# Patient Record
Sex: FEMALE | Race: WHITE | NOT HISPANIC OR LATINO | Employment: OTHER | URBAN - METROPOLITAN AREA
[De-identification: names, ages, dates, MRNs, and addresses within clinical notes are randomized per-mention and may not be internally consistent; named-entity substitution may affect disease eponyms.]

---

## 2018-11-22 ENCOUNTER — APPOINTMENT (EMERGENCY)
Dept: RADIOLOGY | Facility: HOSPITAL | Age: 83
DRG: 189 | End: 2018-11-22
Payer: COMMERCIAL

## 2018-11-22 ENCOUNTER — HOSPITAL ENCOUNTER (INPATIENT)
Facility: HOSPITAL | Age: 83
LOS: 2 days | Discharge: HOME HEALTH | DRG: 189 | End: 2018-11-24
Attending: EMERGENCY MEDICINE | Admitting: ANESTHESIOLOGY
Payer: COMMERCIAL

## 2018-11-22 DIAGNOSIS — R09.02 HYPOXIA: ICD-10-CM

## 2018-11-22 DIAGNOSIS — J96.00 ACUTE RESPIRATORY FAILURE (HCC): Primary | ICD-10-CM

## 2018-11-22 DIAGNOSIS — E87.70 VOLUME OVERLOAD: ICD-10-CM

## 2018-11-22 DIAGNOSIS — E87.1 HYPONATREMIA: ICD-10-CM

## 2018-11-22 DIAGNOSIS — R06.03 RESPIRATORY DISTRESS: ICD-10-CM

## 2018-11-22 DIAGNOSIS — I10 HYPERTENSION: ICD-10-CM

## 2018-11-22 LAB
ALBUMIN SERPL BCP-MCNC: 3.2 G/DL (ref 3.5–5)
ALP SERPL-CCNC: 89 U/L (ref 46–116)
ALT SERPL W P-5'-P-CCNC: 27 U/L (ref 12–78)
ANION GAP SERPL CALCULATED.3IONS-SCNC: 2 MMOL/L (ref 4–13)
APTT PPP: 39 SECONDS (ref 26–38)
AST SERPL W P-5'-P-CCNC: 24 U/L (ref 5–45)
BASOPHILS # BLD AUTO: 0.04 THOUSANDS/ΜL (ref 0–0.1)
BASOPHILS NFR BLD AUTO: 1 % (ref 0–1)
BILIRUB SERPL-MCNC: 0.4 MG/DL (ref 0.2–1)
BUN SERPL-MCNC: 21 MG/DL (ref 5–25)
CALCIUM SERPL-MCNC: 8.9 MG/DL (ref 8.3–10.1)
CHLORIDE SERPL-SCNC: 96 MMOL/L (ref 100–108)
CO2 SERPL-SCNC: 31 MMOL/L (ref 21–32)
CREAT SERPL-MCNC: 0.98 MG/DL (ref 0.6–1.3)
EOSINOPHIL # BLD AUTO: 0.09 THOUSAND/ΜL (ref 0–0.61)
EOSINOPHIL NFR BLD AUTO: 1 % (ref 0–6)
ERYTHROCYTE [DISTWIDTH] IN BLOOD BY AUTOMATED COUNT: 14.1 % (ref 11.6–15.1)
GFR SERPL CREATININE-BSD FRML MDRD: 54 ML/MIN/1.73SQ M
GLUCOSE SERPL-MCNC: 179 MG/DL (ref 65–140)
HCT VFR BLD AUTO: 37.3 % (ref 34.8–46.1)
HGB BLD-MCNC: 11.5 G/DL (ref 11.5–15.4)
IMM GRANULOCYTES # BLD AUTO: 0.03 THOUSAND/UL (ref 0–0.2)
IMM GRANULOCYTES NFR BLD AUTO: 0 % (ref 0–2)
INR PPP: 1.22 (ref 0.86–1.17)
LYMPHOCYTES # BLD AUTO: 1.52 THOUSANDS/ΜL (ref 0.6–4.47)
LYMPHOCYTES NFR BLD AUTO: 19 % (ref 14–44)
MCH RBC QN AUTO: 30.7 PG (ref 26.8–34.3)
MCHC RBC AUTO-ENTMCNC: 30.8 G/DL (ref 31.4–37.4)
MCV RBC AUTO: 100 FL (ref 82–98)
MONOCYTES # BLD AUTO: 0.76 THOUSAND/ΜL (ref 0.17–1.22)
MONOCYTES NFR BLD AUTO: 9 % (ref 4–12)
NEUTROPHILS # BLD AUTO: 5.67 THOUSANDS/ΜL (ref 1.85–7.62)
NEUTS SEG NFR BLD AUTO: 70 % (ref 43–75)
NRBC BLD AUTO-RTO: 0 /100 WBCS
NT-PROBNP SERPL-MCNC: 2376 PG/ML
PLATELET # BLD AUTO: 222 THOUSANDS/UL (ref 149–390)
PMV BLD AUTO: 10.7 FL (ref 8.9–12.7)
POTASSIUM SERPL-SCNC: 3.5 MMOL/L (ref 3.5–5.3)
PROT SERPL-MCNC: 7.3 G/DL (ref 6.4–8.2)
PROTHROMBIN TIME: 15.2 SECONDS (ref 11.8–14.2)
RBC # BLD AUTO: 3.75 MILLION/UL (ref 3.81–5.12)
SODIUM SERPL-SCNC: 129 MMOL/L (ref 136–145)
TROPONIN I SERPL-MCNC: <0.02 NG/ML
WBC # BLD AUTO: 8.11 THOUSAND/UL (ref 4.31–10.16)

## 2018-11-22 PROCEDURE — 85730 THROMBOPLASTIN TIME PARTIAL: CPT | Performed by: EMERGENCY MEDICINE

## 2018-11-22 PROCEDURE — 85025 COMPLETE CBC W/AUTO DIFF WBC: CPT | Performed by: EMERGENCY MEDICINE

## 2018-11-22 PROCEDURE — 84484 ASSAY OF TROPONIN QUANT: CPT | Performed by: EMERGENCY MEDICINE

## 2018-11-22 PROCEDURE — 99285 EMERGENCY DEPT VISIT HI MDM: CPT

## 2018-11-22 PROCEDURE — 96375 TX/PRO/DX INJ NEW DRUG ADDON: CPT

## 2018-11-22 PROCEDURE — 94660 CPAP INITIATION&MGMT: CPT

## 2018-11-22 PROCEDURE — 71045 X-RAY EXAM CHEST 1 VIEW: CPT

## 2018-11-22 PROCEDURE — 80053 COMPREHEN METABOLIC PANEL: CPT | Performed by: EMERGENCY MEDICINE

## 2018-11-22 PROCEDURE — 94760 N-INVAS EAR/PLS OXIMETRY 1: CPT

## 2018-11-22 PROCEDURE — 83880 ASSAY OF NATRIURETIC PEPTIDE: CPT | Performed by: EMERGENCY MEDICINE

## 2018-11-22 PROCEDURE — 94640 AIRWAY INHALATION TREATMENT: CPT

## 2018-11-22 PROCEDURE — 93005 ELECTROCARDIOGRAM TRACING: CPT

## 2018-11-22 PROCEDURE — 85610 PROTHROMBIN TIME: CPT | Performed by: EMERGENCY MEDICINE

## 2018-11-22 PROCEDURE — 36415 COLL VENOUS BLD VENIPUNCTURE: CPT | Performed by: EMERGENCY MEDICINE

## 2018-11-22 PROCEDURE — 96374 THER/PROPH/DIAG INJ IV PUSH: CPT

## 2018-11-22 RX ORDER — NITROGLYCERIN 20 MG/100ML
5-200 INJECTION INTRAVENOUS
Status: DISCONTINUED | OUTPATIENT
Start: 2018-11-22 | End: 2018-11-23

## 2018-11-22 RX ORDER — LEVALBUTEROL 1.25 MG/.5ML
1.25 SOLUTION, CONCENTRATE RESPIRATORY (INHALATION)
Status: DISCONTINUED | OUTPATIENT
Start: 2018-11-23 | End: 2018-11-23

## 2018-11-22 RX ORDER — CHLORHEXIDINE GLUCONATE 0.12 MG/ML
15 RINSE ORAL EVERY 12 HOURS SCHEDULED
Status: DISCONTINUED | OUTPATIENT
Start: 2018-11-22 | End: 2018-11-24

## 2018-11-22 RX ORDER — IPRATROPIUM BROMIDE AND ALBUTEROL SULFATE 2.5; .5 MG/3ML; MG/3ML
3 SOLUTION RESPIRATORY (INHALATION)
Status: DISCONTINUED | OUTPATIENT
Start: 2018-11-22 | End: 2018-11-22

## 2018-11-22 RX ORDER — HEPARIN SODIUM 5000 [USP'U]/ML
5000 INJECTION, SOLUTION INTRAVENOUS; SUBCUTANEOUS EVERY 8 HOURS SCHEDULED
Status: DISCONTINUED | OUTPATIENT
Start: 2018-11-22 | End: 2018-11-24 | Stop reason: HOSPADM

## 2018-11-22 RX ORDER — FUROSEMIDE 10 MG/ML
80 INJECTION INTRAMUSCULAR; INTRAVENOUS ONCE
Status: COMPLETED | OUTPATIENT
Start: 2018-11-22 | End: 2018-11-22

## 2018-11-22 RX ADMIN — NITROGLYCERIN 20 MCG/MIN: 20 INJECTION INTRAVENOUS at 21:57

## 2018-11-22 RX ADMIN — FUROSEMIDE 80 MG: 10 INJECTION, SOLUTION INTRAMUSCULAR; INTRAVENOUS at 21:48

## 2018-11-22 RX ADMIN — IPRATROPIUM BROMIDE AND ALBUTEROL SULFATE 3 ML: .5; 3 SOLUTION RESPIRATORY (INHALATION) at 21:52

## 2018-11-23 ENCOUNTER — APPOINTMENT (INPATIENT)
Dept: NON INVASIVE DIAGNOSTICS | Facility: HOSPITAL | Age: 83
DRG: 189 | End: 2018-11-23
Payer: COMMERCIAL

## 2018-11-23 PROBLEM — J96.00 ACUTE RESPIRATORY FAILURE (HCC): Status: ACTIVE | Noted: 2018-11-23

## 2018-11-23 PROBLEM — E87.70 VOLUME OVERLOAD: Status: ACTIVE | Noted: 2018-11-23

## 2018-11-23 PROBLEM — I10 HYPERTENSION: Status: ACTIVE | Noted: 2018-11-23

## 2018-11-23 LAB
ALBUMIN SERPL BCP-MCNC: 3.3 G/DL (ref 3.5–5)
ANION GAP SERPL CALCULATED.3IONS-SCNC: 5 MMOL/L (ref 4–13)
ANION GAP SERPL CALCULATED.3IONS-SCNC: 6 MMOL/L (ref 4–13)
ATRIAL RATE: 77 BPM
BASOPHILS # BLD AUTO: 0.04 THOUSANDS/ΜL (ref 0–0.1)
BASOPHILS NFR BLD AUTO: 1 % (ref 0–1)
BUN SERPL-MCNC: 17 MG/DL (ref 5–25)
BUN SERPL-MCNC: 21 MG/DL (ref 5–25)
CALCIUM SERPL-MCNC: 8.8 MG/DL (ref 8.3–10.1)
CALCIUM SERPL-MCNC: 9.2 MG/DL (ref 8.3–10.1)
CHLORIDE SERPL-SCNC: 106 MMOL/L (ref 100–108)
CHLORIDE SERPL-SCNC: 107 MMOL/L (ref 100–108)
CO2 SERPL-SCNC: 31 MMOL/L (ref 21–32)
CO2 SERPL-SCNC: 35 MMOL/L (ref 21–32)
CREAT SERPL-MCNC: 1.03 MG/DL (ref 0.6–1.3)
CREAT SERPL-MCNC: 1.04 MG/DL (ref 0.6–1.3)
EOSINOPHIL # BLD AUTO: 0.05 THOUSAND/ΜL (ref 0–0.61)
EOSINOPHIL NFR BLD AUTO: 1 % (ref 0–6)
ERYTHROCYTE [DISTWIDTH] IN BLOOD BY AUTOMATED COUNT: 14 % (ref 11.6–15.1)
GFR SERPL CREATININE-BSD FRML MDRD: 50 ML/MIN/1.73SQ M
GFR SERPL CREATININE-BSD FRML MDRD: 50 ML/MIN/1.73SQ M
GLUCOSE SERPL-MCNC: 106 MG/DL (ref 65–140)
GLUCOSE SERPL-MCNC: 148 MG/DL (ref 65–140)
GLUCOSE SERPL-MCNC: 163 MG/DL (ref 65–140)
GLUCOSE SERPL-MCNC: 180 MG/DL (ref 65–140)
GLUCOSE SERPL-MCNC: 207 MG/DL (ref 65–140)
GLUCOSE SERPL-MCNC: 92 MG/DL (ref 65–140)
HCT VFR BLD AUTO: 32.6 % (ref 34.8–46.1)
HGB BLD-MCNC: 10.4 G/DL (ref 11.5–15.4)
IMM GRANULOCYTES # BLD AUTO: 0.02 THOUSAND/UL (ref 0–0.2)
IMM GRANULOCYTES NFR BLD AUTO: 0 % (ref 0–2)
LYMPHOCYTES # BLD AUTO: 1.28 THOUSANDS/ΜL (ref 0.6–4.47)
LYMPHOCYTES NFR BLD AUTO: 19 % (ref 14–44)
MAGNESIUM SERPL-MCNC: 2.2 MG/DL (ref 1.6–2.6)
MAGNESIUM SERPL-MCNC: 2.3 MG/DL (ref 1.6–2.6)
MCH RBC QN AUTO: 31.3 PG (ref 26.8–34.3)
MCHC RBC AUTO-ENTMCNC: 31.9 G/DL (ref 31.4–37.4)
MCV RBC AUTO: 98 FL (ref 82–98)
MONOCYTES # BLD AUTO: 0.69 THOUSAND/ΜL (ref 0.17–1.22)
MONOCYTES NFR BLD AUTO: 10 % (ref 4–12)
NEUTROPHILS # BLD AUTO: 4.79 THOUSANDS/ΜL (ref 1.85–7.62)
NEUTS SEG NFR BLD AUTO: 69 % (ref 43–75)
NRBC BLD AUTO-RTO: 0 /100 WBCS
P AXIS: 46 DEGREES
PHOSPHATE SERPL-MCNC: 3.4 MG/DL (ref 2.3–4.1)
PHOSPHATE SERPL-MCNC: 3.6 MG/DL (ref 2.3–4.1)
PLATELET # BLD AUTO: 184 THOUSANDS/UL (ref 149–390)
PLATELET # BLD AUTO: 206 THOUSANDS/UL (ref 149–390)
PMV BLD AUTO: 10.7 FL (ref 8.9–12.7)
PMV BLD AUTO: 10.7 FL (ref 8.9–12.7)
POTASSIUM SERPL-SCNC: 3.6 MMOL/L (ref 3.5–5.3)
POTASSIUM SERPL-SCNC: 3.8 MMOL/L (ref 3.5–5.3)
PR INTERVAL: 156 MS
PROCALCITONIN SERPL-MCNC: <0.05 NG/ML
QRS AXIS: 41 DEGREES
QRSD INTERVAL: 112 MS
QT INTERVAL: 418 MS
QTC INTERVAL: 473 MS
RBC # BLD AUTO: 3.32 MILLION/UL (ref 3.81–5.12)
SODIUM SERPL-SCNC: 144 MMOL/L (ref 136–145)
SODIUM SERPL-SCNC: 146 MMOL/L (ref 136–145)
T WAVE AXIS: 61 DEGREES
TROPONIN I SERPL-MCNC: 0.02 NG/ML
TROPONIN I SERPL-MCNC: <0.02 NG/ML
TROPONIN I SERPL-MCNC: <0.02 NG/ML
VENTRICULAR RATE: 77 BPM
WBC # BLD AUTO: 6.87 THOUSAND/UL (ref 4.31–10.16)

## 2018-11-23 PROCEDURE — 84145 PROCALCITONIN (PCT): CPT | Performed by: PHYSICIAN ASSISTANT

## 2018-11-23 PROCEDURE — 94760 N-INVAS EAR/PLS OXIMETRY 1: CPT

## 2018-11-23 PROCEDURE — 84484 ASSAY OF TROPONIN QUANT: CPT | Performed by: PHYSICIAN ASSISTANT

## 2018-11-23 PROCEDURE — 85049 AUTOMATED PLATELET COUNT: CPT | Performed by: PHYSICIAN ASSISTANT

## 2018-11-23 PROCEDURE — 83735 ASSAY OF MAGNESIUM: CPT | Performed by: PHYSICIAN ASSISTANT

## 2018-11-23 PROCEDURE — 93306 TTE W/DOPPLER COMPLETE: CPT | Performed by: INTERNAL MEDICINE

## 2018-11-23 PROCEDURE — 84100 ASSAY OF PHOSPHORUS: CPT | Performed by: PHYSICIAN ASSISTANT

## 2018-11-23 PROCEDURE — 93306 TTE W/DOPPLER COMPLETE: CPT

## 2018-11-23 PROCEDURE — 99292 CRITICAL CARE ADDL 30 MIN: CPT | Performed by: ANESTHESIOLOGY

## 2018-11-23 PROCEDURE — 93010 ELECTROCARDIOGRAM REPORT: CPT | Performed by: INTERNAL MEDICINE

## 2018-11-23 PROCEDURE — 85025 COMPLETE CBC W/AUTO DIFF WBC: CPT | Performed by: PHYSICIAN ASSISTANT

## 2018-11-23 PROCEDURE — 99291 CRITICAL CARE FIRST HOUR: CPT | Performed by: ANESTHESIOLOGY

## 2018-11-23 PROCEDURE — 80069 RENAL FUNCTION PANEL: CPT | Performed by: PHYSICIAN ASSISTANT

## 2018-11-23 PROCEDURE — 82948 REAGENT STRIP/BLOOD GLUCOSE: CPT

## 2018-11-23 PROCEDURE — 94640 AIRWAY INHALATION TREATMENT: CPT

## 2018-11-23 PROCEDURE — 94660 CPAP INITIATION&MGMT: CPT

## 2018-11-23 PROCEDURE — 80048 BASIC METABOLIC PNL TOTAL CA: CPT | Performed by: PHYSICIAN ASSISTANT

## 2018-11-23 RX ORDER — DONEPEZIL HYDROCHLORIDE 5 MG/1
5 TABLET, FILM COATED ORAL
COMMUNITY

## 2018-11-23 RX ORDER — HYDRALAZINE HYDROCHLORIDE 20 MG/ML
5 INJECTION INTRAMUSCULAR; INTRAVENOUS EVERY 6 HOURS PRN
Status: DISCONTINUED | OUTPATIENT
Start: 2018-11-23 | End: 2018-11-24 | Stop reason: HOSPADM

## 2018-11-23 RX ORDER — LEVOTHYROXINE SODIUM 0.1 MG/1
100 TABLET ORAL DAILY
COMMUNITY

## 2018-11-23 RX ORDER — LEVOTHYROXINE SODIUM 0.1 MG/1
100 TABLET ORAL
Status: DISCONTINUED | OUTPATIENT
Start: 2018-11-24 | End: 2018-11-24 | Stop reason: HOSPADM

## 2018-11-23 RX ORDER — INSULIN GLARGINE 100 [IU]/ML
18 INJECTION, SOLUTION SUBCUTANEOUS
COMMUNITY

## 2018-11-23 RX ORDER — MEMANTINE HYDROCHLORIDE 10 MG/1
10 TABLET ORAL 2 TIMES DAILY
Status: DISCONTINUED | OUTPATIENT
Start: 2018-11-23 | End: 2018-11-24 | Stop reason: HOSPADM

## 2018-11-23 RX ORDER — DONEPEZIL HYDROCHLORIDE 5 MG/1
5 TABLET, FILM COATED ORAL
Status: DISCONTINUED | OUTPATIENT
Start: 2018-11-23 | End: 2018-11-24 | Stop reason: HOSPADM

## 2018-11-23 RX ORDER — NYSTATIN 100000 U/G
CREAM TOPICAL 2 TIMES DAILY
COMMUNITY

## 2018-11-23 RX ORDER — CELECOXIB 100 MG/1
100 CAPSULE ORAL DAILY
COMMUNITY

## 2018-11-23 RX ORDER — POTASSIUM CHLORIDE 20 MEQ/1
20 TABLET, EXTENDED RELEASE ORAL ONCE
Status: COMPLETED | OUTPATIENT
Start: 2018-11-23 | End: 2018-11-23

## 2018-11-23 RX ORDER — METOPROLOL TARTRATE 50 MG/1
25 TABLET, FILM COATED ORAL EVERY 12 HOURS SCHEDULED
COMMUNITY

## 2018-11-23 RX ORDER — POTASSIUM CHLORIDE 20MEQ/15ML
40 LIQUID (ML) ORAL ONCE
Status: COMPLETED | OUTPATIENT
Start: 2018-11-23 | End: 2018-11-23

## 2018-11-23 RX ORDER — OXYBUTYNIN CHLORIDE 5 MG/1
5 TABLET, EXTENDED RELEASE ORAL DAILY
COMMUNITY

## 2018-11-23 RX ORDER — MEMANTINE HYDROCHLORIDE 10 MG/1
10 TABLET ORAL 2 TIMES DAILY
COMMUNITY

## 2018-11-23 RX ORDER — FUROSEMIDE 10 MG/ML
40 INJECTION INTRAMUSCULAR; INTRAVENOUS ONCE
Status: COMPLETED | OUTPATIENT
Start: 2018-11-23 | End: 2018-11-23

## 2018-11-23 RX ADMIN — METOPROLOL TARTRATE 25 MG: 25 TABLET, FILM COATED ORAL at 11:43

## 2018-11-23 RX ADMIN — HEPARIN SODIUM 5000 UNITS: 5000 INJECTION, SOLUTION INTRAVENOUS; SUBCUTANEOUS at 14:08

## 2018-11-23 RX ADMIN — CHLORHEXIDINE GLUCONATE 0.12% ORAL RINSE 15 ML: 1.2 LIQUID ORAL at 14:01

## 2018-11-23 RX ADMIN — FUROSEMIDE 40 MG: 10 INJECTION, SOLUTION INTRAMUSCULAR; INTRAVENOUS at 11:43

## 2018-11-23 RX ADMIN — LEVALBUTEROL HYDROCHLORIDE 1.25 MG: 1.25 SOLUTION, CONCENTRATE RESPIRATORY (INHALATION) at 08:01

## 2018-11-23 RX ADMIN — POTASSIUM CHLORIDE 40 MEQ: 20 SOLUTION ORAL at 11:42

## 2018-11-23 RX ADMIN — DONEPEZIL HYDROCHLORIDE 5 MG: 5 TABLET ORAL at 21:27

## 2018-11-23 RX ADMIN — HYDRALAZINE HYDROCHLORIDE 5 MG: 20 INJECTION INTRAMUSCULAR; INTRAVENOUS at 19:12

## 2018-11-23 RX ADMIN — POTASSIUM CHLORIDE 20 MEQ: 1500 TABLET, EXTENDED RELEASE ORAL at 17:39

## 2018-11-23 RX ADMIN — CHLORHEXIDINE GLUCONATE 0.12% ORAL RINSE 15 ML: 1.2 LIQUID ORAL at 21:26

## 2018-11-23 RX ADMIN — HYDRALAZINE HYDROCHLORIDE 5 MG: 20 INJECTION INTRAMUSCULAR; INTRAVENOUS at 12:57

## 2018-11-23 RX ADMIN — IPRATROPIUM BROMIDE 0.5 MG: 0.5 SOLUTION RESPIRATORY (INHALATION) at 08:01

## 2018-11-23 RX ADMIN — HEPARIN SODIUM 5000 UNITS: 5000 INJECTION, SOLUTION INTRAVENOUS; SUBCUTANEOUS at 06:05

## 2018-11-23 NOTE — ED PROVIDER NOTES
History  Chief Complaint   Patient presents with    Respiratory Distress     Patient presents with sudden onset of respiratory distress starting immediately PTA     Acute respiratory distress  History limited due to medical urgency and pt unable to speak on arrival due to resp distress  Hx provided by relative who is unaware of medical history but reports she "usually takes a water pill "   Pt unable to speak and provide a detailed history  Placed on bipap immediately after arrival for resp distress  None       No past medical history on file  No past surgical history on file  No family history on file  I have reviewed and agree with the history as documented  Social History   Substance Use Topics    Smoking status: Not on file    Smokeless tobacco: Not on file    Alcohol use Not on file        Review of Systems   Unable to perform ROS: Acuity of condition       Physical Exam  Physical Exam   Constitutional: She appears well-developed and well-nourished  She appears distressed  In extremis, audible wheezing, ashen and cyanotic, unable to speak due to respiratory distress   HENT:   Head: Normocephalic and atraumatic  Eyes: Pupils are equal, round, and reactive to light  EOM are normal    Neck: Normal range of motion  Neck supple  JVD present  Cardiovascular: Exam reveals no gallop and no friction rub  No murmur heard  tachycardic   Pulmonary/Chest: She is in respiratory distress  She has wheezes  Severe respiratory distress, limited air movement, audible wheezing  Abdominal: She exhibits no distension  There is no tenderness  Musculoskeletal: Normal range of motion  She exhibits edema  She exhibits no deformity  Neurological: She is alert  She exhibits normal muscle tone  Coordination normal    Skin: Skin is warm and dry  Capillary refill takes 2 to 3 seconds  Nursing note and vitals reviewed        Vital Signs  ED Triage Vitals [11/22/18 2215]   Temp Pulse Respirations Blood Pressure SpO2   -- 62 (!) 26 165/89 100 %      Temp src Heart Rate Source Patient Position - Orthostatic VS BP Location FiO2 (%)   -- -- -- -- --      Pain Score       --           Vitals:    11/22/18 2215   BP: 165/89   Pulse: 62       Visual Acuity      ED Medications  Medications   ipratropium-albuterol (DUO-NEB) 0 5-2 5 mg/3 mL inhalation solution 3 mL (3 mL Nebulization Given 11/22/18 2152)   nitroGLYcerin (TRIDIL) 50 mg in 250 mL infusion (premix) (10 mcg/min Intravenous Rate/Dose Change 11/22/18 2232)   ipratropium-albuterol (DUO-NEB) 0 5-2 5 mg/3 mL inhalation solution 3 mL (not administered)   furosemide (LASIX) injection 80 mg (80 mg Intravenous Given 11/22/18 2148)       Diagnostic Studies  Results Reviewed     Procedure Component Value Units Date/Time    B-type natriuretic peptide [[id number]]  (Abnormal) Collected:  11/22/18 2147    Lab Status:  Final result Specimen:  Blood from Arm, Right Updated:  11/22/18 2215     NT-proBNP 2,376 (H) pg/mL     Troponin I [[id number]]  (Normal) Collected:  11/22/18 2147    Lab Status:  Final result Specimen:  Blood from Arm, Right Updated:  11/22/18 2212     Troponin I <0 02 ng/mL     Protime-INR [[id number]]  (Abnormal) Collected:  11/22/18 2147    Lab Status:  Final result Specimen:  Blood from Arm, Right Updated:  11/22/18 2209     Protime 15 2 (H) seconds      INR 1 22 (H)    APTT [[id number]]  (Abnormal) Collected:  11/22/18 2147    Lab Status:  Final result Specimen:  Blood from Arm, Right Updated:  11/22/18 2209     PTT 39 (H) seconds     Comprehensive metabolic panel [[id number]]  (Abnormal) Collected:  11/22/18 2147    Lab Status:  Final result Specimen:  Blood from Arm, Right Updated:  11/22/18 2209     Sodium 129 (L) mmol/L      Potassium 3 5 mmol/L      Chloride 96 (L) mmol/L      CO2 31 mmol/L      ANION GAP 2 (L) mmol/L      BUN 21 mg/dL      Creatinine 0 98 mg/dL      Glucose 179 (H) mg/dL      Calcium 8 9 mg/dL      AST 24 U/L      ALT 27 U/L Alkaline Phosphatase 89 U/L      Total Protein 7 3 g/dL      Albumin 3 2 (L) g/dL      Total Bilirubin 0 40 mg/dL      eGFR 54 ml/min/1 73sq m     Narrative:         National Kidney Disease Education Program recommendations are as follows:  GFR calculation is accurate only with a steady state creatinine  Chronic Kidney disease less than 60 ml/min/1 73 sq  meters  Kidney failure less than 15 ml/min/1 73 sq  meters      CBC and differential [[id number]]  (Abnormal) Collected:  11/22/18 2147    Lab Status:  Final result Specimen:  Blood from Arm, Right Updated:  11/22/18 2154     WBC 8 11 Thousand/uL      RBC 3 75 (L) Million/uL      Hemoglobin 11 5 g/dL      Hematocrit 37 3 %       (H) fL      MCH 30 7 pg      MCHC 30 8 (L) g/dL      RDW 14 1 %      MPV 10 7 fL      Platelets 814 Thousands/uL      nRBC 0 /100 WBCs      Neutrophils Relative 70 %      Immat GRANS % 0 %      Lymphocytes Relative 19 %      Monocytes Relative 9 %      Eosinophils Relative 1 %      Basophils Relative 1 %      Neutrophils Absolute 5 67 Thousands/µL      Immature Grans Absolute 0 03 Thousand/uL      Lymphocytes Absolute 1 52 Thousands/µL      Monocytes Absolute 0 76 Thousand/µL      Eosinophils Absolute 0 09 Thousand/µL      Basophils Absolute 0 04 Thousands/µL                  XR chest 1 view portable   ED Interpretation by Viktoriya Blue MD (11/22 2153)   Volume overload c/w clinical presentation of acute heart failure                 Procedures  ECG 12 Lead Documentation  Date/Time: 11/22/2018 10:03 PM  Performed by: Susanne Hernandez  Authorized by: Michael CM     Indications / Diagnosis:  Resp failure  ECG reviewed by me, the ED Provider: yes    Patient location:  ED  Previous ECG:     Previous ECG:  Unavailable    Comparison to cardiac monitor: Yes    Interpretation:     Interpretation: non-specific    Quality:     Tracing quality:  Limited by artifact  Rate:     ECG rate:  77    ECG rate assessment: normal    Rhythm:     Rhythm: sinus rhythm    Comments:      Intraventricular conduction delay  Interpretation somewhat limited by artifact but no apparent ischemic changes  No comparison ekg available  Impression - nondiagnostic EKG  Phone Contacts  ED Phone Contact    ED Course  ED Course as of Nov 22 2237   u Nov 22, 2018 2201 10:01 PM reexamined, appears significantly and feels improved on BiPAP  NTG hanging now  CXR interpreted by me c/w CHF  2205 10:05 PM spoke with ICU will see pt  MDM  CritCare Time    Disposition  Final diagnoses:   Acute respiratory failure ([address] )   Hypoxia   Respiratory distress   Volume overload   Hypertension   Hyponatremia     Time reflects when diagnosis was documented in both MDM as applicable and the Disposition within this note     Time User Action Codes Description Comment    11/22/2018 10:15 PM Melody Jones Add [J96 00] Acute respiratory failure ([address] )     11/22/2018 10:15 PM Melody Melissa Add [R09 02] Hypoxia     11/22/2018 10:15 PM Melody Melissa Add [R06 03] Respiratory distress     11/22/2018 10:15 PM Oconnor, Josep Add [E87 70] Volume overload     11/22/2018 10:16 PM Oconnor, Josep Add [I10] Hypertension     11/22/2018 10:17 PM Oconnor, Ingelerik Pipestone Add [E87 1] Hyponatremia       ED Disposition     ED Disposition Condition Comment    Admit  Case was discussed with Dr Humera Rodriguez and the patient's admission status was agreed to be Admission Status: inpatient status to the service of Dr Humera Rodriguez   Follow-up Information    None         Patient's Medications    No medications on file     No discharge procedures on file      ED Provider  Electronically Signed by           Catarina Bañuelos MD  11/22/18 7328

## 2018-11-23 NOTE — RESPIRATORY THERAPY NOTE
patient remains on BiPAP at this time  Patient is LOS # 1 s/p being admitted for acute respiratory failure secondary to CHF  Patient resting without great apparent respiratory distress given multiple doses of lasix in attempt to improve pulmonary status   Plan: continue current support until further orders

## 2018-11-23 NOTE — SOCIAL WORK
Granddaughter Sonya Zavala was hoping pt could be transferred to [facility], [address] which would be closer to home  Sonya Zavala is trying to reach PCP-Dr Mary Jane Tapia to be the accepting physician  ICU has been trying to reach him also  CM contacted [facility] for transport cost and for BLS it would be $2,782 00  This is too expensive  and Sonya Zavala is debating whether she can transport pt herself  NEGRITO did get an accepting [facility] agency for PT/OT/Nursing/Aide through [facility]

## 2018-11-23 NOTE — SOCIAL WORK
CM met with pt and granddaughter Hilary Barger at bedside  Pt lives with her son Kev Macias, but when he is at work she stays with Hilary Barger  Her home with Elham Muñoz is a 2 story house, but her bedroom is on the first floor  A ramp is present to enter the residence  When she stays with Hilary Barger, she has no steps to navigate  Pt has a cane, walker and a w/c to use prn to ambulate  She is assisted with ADL's by Hilary Barger and her meds are managed by a family member  She is a diabetic and a glucometer is used to monitor her blood sugars  She lives in [place] and is in this area for the holidays  Her physician is Dr Heather Peter  Denies substance abuse or mental health issues  She does have dementia  She quit smoking 33 years ago  She has an Advanced Directive and her POA is her son Elham Muñoz  CM discussed d/c plan and Hilary Barger is hoping [facility] services can be set up for PT/OT/Nursing/ and Aide to the address of [address]  Discussion is also occurring at the present time to have pt transferred to [facility]   ICU is in talks to get an accepting physician  Family is discussing if pt needs BLS or if they will transport themselves  CM will follow through hospitalization  CM reviewed discharge planning process including the following: identifying help at home, patient preference for discharge planning needs, pharmacy preference, and availability of treatment team to discuss questions or concerns patient and/or family may have regarding understanding medications and recognizing signs and symptoms once discharged  CM also encouraged patient to follow up with all recommended appointments after discharge  Patient advised of importance for patient and family to participate in managing patients medical well being  CM name and role reviewed  Discharge Checklist reviewed and CM will continue to monitor for progress toward discharge goals in nursing and provider rounds

## 2018-11-23 NOTE — UTILIZATION REVIEW
[address]  Utilization Review Department  Phone: [phone]; Fax [phone]  [email or web address]  ATTENTION: Please call with any questions or concerns to [phone]  and carefully listen to the prompts so that you are directed to the right person  Send all requests for admission clinical reviews, approved or denied determinations and any other requests to fax [phone]  All voicemails are confidential     Initial Clinical Review    Admission: Date/Time/Statement: 11/22/18 @ 2206     Orders Placed This Encounter   Procedures    Inpatient Admission (expected length of stay for this patient is greater than two midnights)     Standing Status:   Standing     Number of Occurrences:   1     Order Specific Question:   Admitting Physician     Answer:   Elise Girt     Order Specific Question:   Level of Care     Answer:   Critical Care [15]     Order Specific Question:   Estimated length of stay     Answer:   More than 2 Midnights     Order Specific Question:   Certification     Answer:   I certify that inpatient services are medically necessary for this patient for a duration of greater than two midnights  See H&P and MD Progress Notes for additional information about the patient's course of treatment  ED: Date/Time/Mode of Arrival:   ED Arrival Information     Expected Arrival Acuity Means of Arrival Escorted By Service Admission Type    - 11/22/2018 21:30 Emergent Walk-In Family Member Critical Care/ICU Emergency    Arrival Complaint    Difficulty Breathing          Chief Complaint:   Chief Complaint   Patient presents with    Respiratory Distress     Patient presents with sudden onset of respiratory distress starting immediately PTA       History of Illness: 80 y o  female who [facility]22[facility] for evaluation of tachypnea and increased shortness of breath    Patient presents with her granddaughter who are both visiting the area over the Thanksgiving holiday  Patient lives with her son in the [place] area  From discussion with patient and granddaughter she has a past medical history of dementia, COPD, and insulin-dependent diabetes, other medical history is unknown at this time  Granddaughter states that over the past week patient has displayed progressive dyspnea with minimal exertion  After climbing a flight of stairs patient became acutely short of breath with obvious increased work of breathing  She is noted to have audible wheezing, and crackles prompting ED evaluation  Upon arrival to emergency department patient was found to be tachypneic and hypoxemic into the mid 70s  She was immediately placed on BiPAP  Critical Care Medicine was asked to evaluate patient given acute on chronic hypoxic respiratory failure requiring BiPAP  ED Vital Signs:   ED Triage Vitals   Temperature Pulse Respirations Blood Pressure SpO2   11/22/18 2145 11/22/18 2141 11/22/18 2141 11/22/18 2141 11/22/18 2141   97 6 °F (36 4 °C) 78 22 (!) 195/97 92 %      Temp Source Heart Rate Source Patient Position - Orthostatic VS BP Location FiO2 (%)   11/22/18 2145 11/22/18 2145 11/22/18 2145 11/22/18 2145 --   Oral Monitor Sitting Left arm       Pain Score       11/22/18 2145       Worst Possible Pain        Wt Readings from Last 1 Encounters:   11/23/18 111 kg (244 lb 0 8 oz)       Vital Signs (abnormal): HR 54-78, RR 15-38, /83--203/87, Pox %    Abnormal Labs/Diagnostic Test Results: Na 129, CL 96, Albumin 3 2, BNP 2376  Trop <0 02    CXR -- revealed diffuse vascular congestion      ED Treatment:   Medication Administration from 11/22/2018 2130 to 11/23/2018 0041       Date/Time Order Dose Route Action     11/22/2018 2148 furosemide (LASIX) injection 80 mg 80 mg Intravenous Given     11/22/2018 2152 ipratropium-albuterol (DUO-NEB) 0 5-2 5 mg/3 mL inhalation solution 3 mL 3 mL Nebulization Given     11/22/2018 2304 nitroGLYcerin (TRIDIL) 50 mg in 250 mL infusion (premix) 5 mcg/min Intravenous Restarted     11/22/2018 2245 nitroGLYcerin (TRIDIL) 50 mg in 250 mL infusion (premix) 0 mcg/min Intravenous Hold     11/22/2018 2232 nitroGLYcerin (TRIDIL) 50 mg in 250 mL infusion (premix) 10 mcg/min Intravenous Rate/Dose Change     11/22/2018 2157 nitroGLYcerin (TRIDIL) 50 mg in 250 mL infusion (premix) 20 mcg/min Intravenous New Bag     11/22/2018 2215 ipratropium-albuterol (DUO-NEB) 0 5-2 5 mg/3 mL inhalation solution 3 mL 3 mL Nebulization Not Given       Past Medical/Surgical History:  Past Medical History:   Diagnosis Date    COPD (chronic obstructive pulmonary disease) [facility] )     Diabetes mellitus ([facility] )        Admitting Diagnosis: Acute respiratory failure ([facility] ) [J96 00]  Hyponatremia [E87 1]  Respiratory distress [R06 03]  Hypertension [I10]  Hypoxia [R09 02]  Difficulty breathing [R06 89]  Volume overload [E87 70]    Age/Sex: 80 y o  female    Assessment/Plan:   Assessment:   Acute on chronic hypoxic respiratory failure  Diffuse pulmonary edema likely secondary to new onset CHF  HTN  Hyponatremia   COPD  DM II  Dementia    Plan:    ICU,   IS q 1h,   npo, monitor labs  Cont Bipap 14/6, FiO2 80%  Respiratory protocol  Neuro checks q4h  Nursing dysphagia assessment  I&O's  Up with assist  SCD's  PT/OT evals      Admission Orders:  Scheduled Meds:  chlorhexidine 15 mL Swish & Spit Q12H [address]   heparin (porcine) 5,000 Units Subcutaneous Q8H [address]   insulin lispro 1-6 Units Subcutaneous Q6H [address]   ipratropium 0 5 mg Nebulization TID   levalbuterol 1 25 mg Nebulization TID   nitroGLYcerin 5-200 mcg/min Intravenous Titrated

## 2018-11-23 NOTE — UTILIZATION REVIEW
Notification of Inpatient Admission/Inpatient Authorization Request  This is a Notification of Inpatient Admission/Request for Inpatient Authorization for our facility [address] [place] [address]  Be advised that this patient was admitted to our facility under Inpatient Status  Please contact the Utilization Review Department where the patient is receiving care services for additional admission information  Place of Service Code: 24   Place of Service Name: [address]  Presentation Date & Time: 11/22/2018  9:39 PM  Inpatient Admission Date & Time: 11/22/18 2206  Discharge Date & Time: No discharge date for patient encounter  Discharge Disposition (if discharged): Final discharge disposition not confirmed  Attending Physician:   SOILA Adamson  Specialty- Anesthesiology,   [employer] ID- [id number]  [address]  Phone 1: [phone]  Fax: [phone]  Admission Orders     Ordered        11/22/18 2206  Inpatient Admission (expected length of stay for this patient is greater than two midnights)  Once               Facility: [address] [place] [address]  Address: [address] [place] [address]  Phone: [phone]  Tax ID: [id number]  NPI: [id number]  Medicare ID: [id number]    [address]  Utilization Review Department  Phone: [phone]; Fax [phone]  ATTENTION: Please call with any questions or concerns to [phone]  and carefully listen to the prompts so that you are directed to the right person  Send all requests for admission clinical reviews, approved or denied determinations and any other requests to fax [phone]   All voicemails are confidential

## 2018-11-23 NOTE — ED NOTES
NitroGlycerin held at this time, pt has met target systolic BP  Provider made aware        Delano Dubin, RN  11/22/18 8893

## 2018-11-23 NOTE — RESPIRATORY THERAPY NOTE
Called to patient room for patient in acute respiratory distress with noted increased work of breathing patient placed on BiPAP via face mask tolerating settings fairly well with improved air movement noted aerosol treatment given in line with BiPAP as ordered  Plan: continue current support until further orders

## 2018-11-23 NOTE — RESPIRATORY THERAPY NOTE
11/23/18 0810   Non-Invasive Information   Interface Face mask   Non-Invasive Ventilation Mode BiPAP   SpO2 99 %   $ Pulse Oximetry Spot Check Charge Completed   Resp Comments Pt remains on BiPAP 10/5 at this time  No changes made to the BiPAP; will continue to monitor pt  Plan: continue current support until further orders     Non-Invasive Settings   IPAP (cm) 10 cm   EPAP (cm) 5 cm   Rate (Set) 8   FiO2 (%) 40   Flow (lpm) 106 9   Pressure Support (cm H2O) 5   Rise Time 2   Inspiratory Time (Set) 1   Humidification (heater)   Temperature (Set) 31   Non-Invasive Readings   Total Rate 22   MV (Mech) 10 6   Peak Pressure (Obs) 13   Spontaneous Vt (mL) 391   Heater Temperature (Obs) 32 9   Leak (lpm) 9   Skin Intervention Skin intact   Non-Invasive Alarms   Insp Pressure High (cm H20) 20   Insp Pressure Low (cm H20) 5   Low Insp Pressure Time (sec) 20 sec   MV Low (L/min) 4   Vt High (mL) 1200   Vt Low (mL) 200   High Resp Rate (BPM) 30 BPM   Low Resp Rate (BPM) 8 BPM   Apnea Interval (sec) 20   Apnea Rate 8   Apnea Pressure 10

## 2018-11-23 NOTE — H&P
History and Physical - Critical Care   Jhony Barrera 80 y o  female MRN: [id number]  Unit/Bed#: ED 13 Encounter: [id number]    Reason for Admission / Chief Complaint:  Acute hypoxic respiratory failure/in setting of volume overload  Questionable new onset CHF  History of Present Illness:  Jhony Barrera is a 80 y o  female who presented to THE [facility] 11/22/18 for evaluation of tachypnea and increased shortness of breath  Patient presents with her granddaughter who are both visiting the area over the Thanksgiving holiday  Patient lives with her son in the [facility] area  From discussion with patient and granddaughter she has a past medical history of dementia, COPD, and insulin-dependent diabetes, other medical history is unknown at this time  Granddaughter states that over the past week patient has displayed progressive dyspnea with minimal exertion  After climbing a flight of stairs patient became acutely short of breath with obvious increased work of breathing  She is noted to have audible wheezing, and crackles prompting ED evaluation  Upon arrival to emergency department patient was found to be tachypneic and hypoxemic into the mid 70s  She was immediately placed on BiPAP  Chest x-ray revealed diffuse vascular congestion  Laboratory workup significant for elevated pro BNP of 2300  She received 80 mg of IV Lasix  Critical Care Medicine was asked to evaluate patient given acute on chronic hypoxic respiratory failure requiring BiPAP  She was subsequently admitted to step-down unit for further medical management and workup  History obtained from the patient      Past Medical History:  Past Medical History:   Diagnosis Date    [facility] obstructive [facility] disease[facility] [address] [facility]     Diabetes mellitus ([facility] [address] )        Past Surgical History:  Past Surgical History:   Procedure Laterality Date    ANKLE SURGERY      APPENDECTOMY      EAR SURGERY      JOINT REPLACEMENT         Past Family History:  History reviewed  No pertinent family history  Social History:  History   Smoking Status    Former Smoker    Quit date: 1985   Smokeless Tobacco    Never Used     History   Alcohol Use No     History   Drug Use No     Marital Status:   Exercise History:     Medications:  Current Facility-Administered Medications   Medication Dose Route Frequency    chlorhexidine (PERIDEX) 0 12 % oral rinse 15 mL  15 mL Swish & Spit Q12H [facility]    heparin (porcine) subcutaneous injection 5,000 Units  5,000 Units Subcutaneous Q8H [facility]    ipratropium (ATROVENT) 0 02 % inhalation solution 0 5 mg  0 5 mg Nebulization TID    levalbuterol (XOPENEX) inhalation solution 1 25 mg  1 25 mg Nebulization TID    nitroGLYcerin (TRIDIL) 50 mg in 250 mL infusion (premix)  5-200 mcg/min Intravenous Titrated     Home medications:  Prior to Admission medications    Not on File     Allergies:  No Known Allergies    ROS:   Review of Systems   Constitutional: Positive for activity change  Negative for appetite change, chills, fatigue and fever  HENT: Negative  Respiratory: Positive for cough, shortness of breath and wheezing  Cardiovascular: Positive for leg swelling  Negative for chest pain and palpitations  Gastrointestinal: Negative  Genitourinary: Negative  Musculoskeletal: Negative  Neurological: Negative  Vitals:  Vitals:    18 2311 18 2330 18 0000 18 0015   BP: 153/84 118/83 135/62 126/88   BP Location:       Pulse: 59 58 (!) 54 58   Resp: 19 (!) 28 18 (!) 32   Temp:       TempSrc:       SpO2: 93% 96% 98% 97%   Weight:         Temperature:   Temp (24hrs), Av 6 °F (36 4 °C), Min:97 6 °F (36 4 °C), Max:97 6 °F (36 4 °C)    Current Temperature: 97 6 °F (36 4 °C)    Weights:   IBW: -92 5 kg  There is no height or weight on file to calculate BMI      Respiratory    Lab Data (Last 4 hours)    None         O2/Vent Data (Last 4 hours)      2150          Non-Invasive Ventilation Mode BiPAP                  Physical Exam:  Physical Exam   Constitutional: She is oriented to person, place, and time  No distress  HENT:   Head: Normocephalic and atraumatic  Eyes: Pupils are equal, round, and reactive to light  EOM are normal    Neck: Normal range of motion  Neck supple  Cardiovascular: Normal rate and intact distal pulses  No murmur heard  Pulmonary/Chest: She is in respiratory distress  She has wheezes  She has rales  Mixed crackles and expiratory wheezing of the bilateral posterior lung fields  Accessory muscle use  Abdominal: Soft  Bowel sounds are normal  She exhibits no distension  There is no tenderness  There is no rebound  Musculoskeletal: She exhibits edema (2+ pitting edema bilaterally)  Neurological: She is alert and oriented to person, place, and time  No cranial nerve deficit  Coordination normal        Labs:    Results from last 7 days  Lab Units 11/22/18  2147   WBC Thousand/uL 8 11   HEMOGLOBIN g/dL 11 5   HEMATOCRIT % 37 3   PLATELETS Thousands/uL 222   NEUTROS PCT % 70   MONOS PCT % 9      Results from last 7 days  Lab Units 11/22/18  2147   POTASSIUM mmol/L 3 5   CHLORIDE mmol/L 96*   CO2 mmol/L 31   BUN mg/dL 21   CREATININE mg/dL 0 98   CALCIUM mg/dL 8 9   ALK PHOS U/L 89   ALT U/L 27   AST U/L 24                Results from last 7 days  Lab Units 11/22/18  2147   INR  1 22*   PTT seconds 39*           0  Lab Value Date/Time   TROPONINI <0 02 11/22/2018 2147       Imaging:  I have personally reviewed pertinent reports  and I have personally reviewed pertinent films in PACS  EKG: This was personally reviewed by myself     Micro:  No results found for: Chip Collar, Scarlett Hawking, [facility]    ______________________________________________________________________    Assessment:   Acute on chronic hypoxic respiratory failure  Diffuse pulmonary edema likely secondary to new onset CHF  HTN  Hyponatremia   COPD  DM II  Dementia    Plan:      Neuro:    Dementia: Continue home Aricept  Delirium precautions  Frequent orientation  Regulate sleep-wake cycles  Daily CAM ICU  CV:    Diffuse pulmonary edema likely secondary to new onset CHF; continue ongoing diuresis  Goal fluid balance net negative 1 5-2 L  Goal SBP <140  Nitro gtt  Check echocardiogram   Trend troponins  Strict I&Os  Daily weights  Lung:    Acute on chronic hypoxic respiratory failure  Wean BiPAP as tolerated to maintain O2 sats greater than 88%  Ongoing diuresis to optimize pulmonary status   Scheduled Atrovent/Xopenex  Encourage incentive spirometry/pulmonary toilet  GI:    Ppx: senna/colace   FEN:     Monitor electrolytes replete as warranted  Mg > 2 0, K >4 0   NPO   :    Strict I&Os  Monitor BUN/creatinine  ID:    Low suspicion for infectious etiology  Will monitor off antibiotics  Check procalcitonin  Monitor fever curve and WBC count  Heme:    H&H and platelets stable  Continue trend monitor  ppx vte: sqh   Endo:    DM II:  Sliding scale insulin coverage while NPO  Msk/Skin:    Frequent offloading repositioning void skin breakdown  PT/OT  Disposition:  Will follow up with patient pharmacy to obtain medication list   Step-down level 1 requiring BiPAP support    ______________________________________________________________________    VTE Pharmacologic Prophylaxis: Heparin  VTE Mechanical Prophylaxis: sequential compression device    Invasive lines and devices: Invasive Devices     Peripheral Intravenous Line            Peripheral IV 11/22/18 Right Arm less than 1 day                Code Status: Level 1 - Full Code  POA:    POLST:      Given critical illness, patient length of stay will require greater than two midnights  Counseling / Coordination of Care  Total Critical Care time spent 48 minutes excluding procedures, teaching and family updates  Portions of the record may have been created with voice recognition software    Occasional wrong word or "sound a like" substitutions may have occurred due to the inherent limitations of voice recognition software  Read the chart carefully and recognize, using context, where substitutions have occurred        Lowell Mclain PA-C

## 2018-11-23 NOTE — PHYSICAL THERAPY NOTE
Physical Therapy Cancellation Note    PT order received  Chart review performed  At this time, Dr Franklin Shane requesting PT await evaluation at this time- reports in process of determining transfer to a hospital closer to pt's home- [address] [place] [address]  PT will follow and evaluate as appropriate      Cloteal Fair, PT, DPT

## 2018-11-23 NOTE — PLAN OF CARE
Problem: DISCHARGE PLANNING - CARE MANAGEMENT  Goal: Discharge to post-acute care or home with appropriate resources  INTERVENTIONS:  - Conduct assessment to determine patient/family and health care team treatment goals, and need for post-acute services based on payer coverage, community resources, and patient preferences, and barriers to discharge  - Address psychosocial, clinical, and financial barriers to discharge as identified in assessment in conjunction with the patient/family and health care team  - Arrange appropriate level of post-acute services according to patients   needs and preference and payer coverage in collaboration with the physician and health care team  - Communicate with and update the patient/family, physician, and health care team regarding progress on the discharge plan  - Arrange appropriate transportation to post-acute venues   Outcome: Progressing  Granddaughter Abraham Marie was hoping pt could be transferred to [address] which would be closer to home  Abraham Marie is trying to reach PCP-Dr Trae Minor to be the accepting physician  ICU has been trying to reach him also  CM contacted [facility] for transport cost and for BLS it would be $[phone]  This is too expensive  and Abraham Marie is debating whether she can transport pt herself  NEGRITO did get an accepting [facility] agency for PT/OT/Nursing/Aide through [facility]

## 2018-11-23 NOTE — PLAN OF CARE
Problem: DISCHARGE PLANNING - CARE MANAGEMENT  Goal: Discharge to post-acute care or home with appropriate resources  INTERVENTIONS:  - Conduct assessment to determine patient/family and health care team treatment goals, and need for post-acute services based on payer coverage, community resources, and patient preferences, and barriers to discharge  - Address psychosocial, clinical, and financial barriers to discharge as identified in assessment in conjunction with the patient/family and health care team  - Arrange appropriate level of post-acute services according to patients   needs and preference and payer coverage in collaboration with the physician and health care team  - Communicate with and update the patient/family, physician, and health care team regarding progress on the discharge plan  - Arrange appropriate transportation to post-acute venues  Outcome: Alessandro MAHAN met with pt and granddaughter Carol Sheets at bedside  Pt lives with her son Uma De La Torre, but when he is at work she stays with Carol Sheets  Her home with Malena Gottron is a 2 story house, but her bedroom is on the first floor  A ramp is present to enter the residence  When she stays with Carol Sheets, she has no steps to navigate  Pt has a cane, walker and a w/c to use prn to ambulate  She is assisted with ADL's by Carol Sheets and her meds are managed by a family member  She is a diabetic and a glucometer is used to monitor her blood sugars  She lives in [place] and is in this area for the holidays  Her physician is Dr Caldwell Puls  Denies substance abuse or mental health issues  She does have dementia  She quit smoking 33 years ago  She has an Advanced Directive and her POA is her son Dori Gottron  NEGRITO discussed d/c plan and Carol Sheets is hoping [facility] services can be set up for PT/OT/Nursing/ and Aide to the address of [address]    Discussion is also occurring at the present time to have pt transferred to [facility]   ICU is in talks to get an accepting physician  Family is discussing if pt needs BLS or if they will transport themselves  CM will follow through hospitalization  CM reviewed discharge planning process including the following: identifying help at home, patient preference for discharge planning needs, pharmacy preference, and availability of treatment team to discuss questions or concerns patient and/or family may have regarding understanding medications and recognizing signs and symptoms once discharged  CM also encouraged patient to follow up with all recommended appointments after discharge  Patient advised of importance for patient and family to participate in managing patients medical well being  CM name and role reviewed  Discharge Checklist reviewed and CM will continue to monitor for progress toward discharge goals in nursing and provider rounds

## 2018-11-24 VITALS
DIASTOLIC BLOOD PRESSURE: 62 MMHG | RESPIRATION RATE: 24 BRPM | WEIGHT: 220.46 LBS | SYSTOLIC BLOOD PRESSURE: 129 MMHG | BODY MASS INDEX: 36.73 KG/M2 | HEIGHT: 65 IN | TEMPERATURE: 97.8 F | OXYGEN SATURATION: 94 % | HEART RATE: 67 BPM

## 2018-11-24 LAB
ALBUMIN SERPL BCP-MCNC: 2.9 G/DL (ref 3.5–5)
ANION GAP SERPL CALCULATED.3IONS-SCNC: 6 MMOL/L (ref 4–13)
BUN SERPL-MCNC: 17 MG/DL (ref 5–25)
CALCIUM SERPL-MCNC: 8.6 MG/DL (ref 8.3–10.1)
CHLORIDE SERPL-SCNC: 107 MMOL/L (ref 100–108)
CO2 SERPL-SCNC: 31 MMOL/L (ref 21–32)
CREAT SERPL-MCNC: 1.03 MG/DL (ref 0.6–1.3)
GFR SERPL CREATININE-BSD FRML MDRD: 50 ML/MIN/1.73SQ M
GLUCOSE SERPL-MCNC: 149 MG/DL (ref 65–140)
MAGNESIUM SERPL-MCNC: 2.2 MG/DL (ref 1.6–2.6)
PHOSPHATE SERPL-MCNC: 3 MG/DL (ref 2.3–4.1)
POTASSIUM SERPL-SCNC: 3.8 MMOL/L (ref 3.5–5.3)
SODIUM SERPL-SCNC: 144 MMOL/L (ref 136–145)

## 2018-11-24 PROCEDURE — 80069 RENAL FUNCTION PANEL: CPT | Performed by: PHYSICIAN ASSISTANT

## 2018-11-24 PROCEDURE — 83735 ASSAY OF MAGNESIUM: CPT | Performed by: PHYSICIAN ASSISTANT

## 2018-11-24 PROCEDURE — 99238 HOSP IP/OBS DSCHRG MGMT 30/<: CPT | Performed by: ANESTHESIOLOGY

## 2018-11-24 RX ORDER — POTASSIUM CHLORIDE 20 MEQ/1
20 TABLET, EXTENDED RELEASE ORAL DAILY
Qty: 30 TABLET | Refills: 0 | Status: SHIPPED | OUTPATIENT
Start: 2018-11-25 | End: 2018-11-24

## 2018-11-24 RX ORDER — FUROSEMIDE 40 MG/1
40 TABLET ORAL DAILY
Qty: 30 TABLET | Refills: 0 | Status: SHIPPED | OUTPATIENT
Start: 2018-11-25

## 2018-11-24 RX ORDER — POTASSIUM CHLORIDE 20 MEQ/1
20 TABLET, EXTENDED RELEASE ORAL DAILY
Status: DISCONTINUED | OUTPATIENT
Start: 2018-11-24 | End: 2018-11-24 | Stop reason: HOSPADM

## 2018-11-24 RX ORDER — FUROSEMIDE 40 MG/1
40 TABLET ORAL DAILY
Status: DISCONTINUED | OUTPATIENT
Start: 2018-11-24 | End: 2018-11-24 | Stop reason: HOSPADM

## 2018-11-24 RX ORDER — POTASSIUM CHLORIDE 20 MEQ/1
20 TABLET, EXTENDED RELEASE ORAL DAILY
Qty: 30 TABLET | Refills: 0 | Status: SHIPPED | OUTPATIENT
Start: 2018-11-25

## 2018-11-24 RX ORDER — METOPROLOL TARTRATE 50 MG/1
50 TABLET, FILM COATED ORAL EVERY 12 HOURS SCHEDULED
Status: DISCONTINUED | OUTPATIENT
Start: 2018-11-24 | End: 2018-11-24 | Stop reason: HOSPADM

## 2018-11-24 RX ORDER — FUROSEMIDE 40 MG/1
40 TABLET ORAL DAILY
Qty: 30 TABLET | Refills: 0 | Status: SHIPPED | OUTPATIENT
Start: 2018-11-25 | End: 2018-11-24

## 2018-11-24 RX ADMIN — METOPROLOL TARTRATE 25 MG: 25 TABLET, FILM COATED ORAL at 10:35

## 2018-11-24 RX ADMIN — MEMANTINE 10 MG: 10 TABLET ORAL at 08:36

## 2018-11-24 RX ADMIN — HEPARIN SODIUM 5000 UNITS: 5000 INJECTION, SOLUTION INTRAVENOUS; SUBCUTANEOUS at 05:08

## 2018-11-24 RX ADMIN — LEVOTHYROXINE SODIUM 100 MCG: 100 TABLET ORAL at 05:11

## 2018-11-24 RX ADMIN — POTASSIUM CHLORIDE 20 MEQ: 1500 TABLET, EXTENDED RELEASE ORAL at 10:34

## 2018-11-24 RX ADMIN — METOPROLOL TARTRATE 25 MG: 25 TABLET, FILM COATED ORAL at 08:36

## 2018-11-24 RX ADMIN — HYDRALAZINE HYDROCHLORIDE 5 MG: 20 INJECTION INTRAMUSCULAR; INTRAVENOUS at 02:50

## 2018-11-24 RX ADMIN — FUROSEMIDE 40 MG: 40 TABLET ORAL at 10:34

## 2018-11-24 RX ADMIN — CHLORHEXIDINE GLUCONATE 0.12% ORAL RINSE 15 ML: 1.2 LIQUID ORAL at 08:36

## 2018-11-24 NOTE — SOCIAL WORK
CM received call from ICU nurse that pt's granddaughter requesting more information about HHC  CM met with Aisha at bedside  Discussed that [facility] [address] would be able to provide HHC to granddaughters address  Pete Sid requesting info regarding HHA - discussed that this is typically an OOP unless pt qualifies for county assistance  Per Pete Lau they reside in [place] [facility] contact info added to [address] reports she will be transporting pt home at d/c and plans to follow up with pt's PCP Monday  Discussed with RN and MD Murray Soto will continue to follow

## 2018-11-24 NOTE — PROGRESS NOTES
Progress Note - Critical Care   Dafne Boone 80 y o  female MRN: [id number]  Unit/Bed#:  Encounter: [id number]    Assessment:   Principal Problem:    Acute on chronic respiratory failure (HCC)  Active Problems:   Acute diastolic heart failure acute exacerbation   COPD   Hypertension   Insulin-dependent DM II     Hypothyroidism   Dementia  Resolved Problems:    * No resolved hospital problems  *    Plan:   Neuro:   · Dementia:  Continue home Aricept/Namenda  · Delirium precautions  Regulate sleep-wake cycles  Daily CAM ICU  CV:   · Acute diastolic heart failure  Echo revealing preserved EF of 60%  Patient appears euvolemic  Transition to p o  Lasix regimen  Goal fluid balance net even  Daily weights  Low-sodium diet  · HTN:  Metoprolol 25 mg b i d   Lung:   · Acute on chronic hypoxic respiratory failure  -improved  No longer requiring BiPAP  · Continue supplemental O2 to maintain saturation greater than 88%  · Scheduled Atrovent/Xopenex  · Encourage incentive spirometry/pulmonary toilet  GI:   · Ppx;senna/colace  FEN:   · Monitor electrolytes  Replete as warranted  Mg> 2 0, K > 4 0  · Cardiac/carb controlled diet  :   · Strict I&Os  · Monitor BUN/creatinine  ID:   · Low suspicion for infectious etiology  Procalcitonin negative  Will monitor off antibiotics  · Trend fever curve and WBC count  Heme:   · H&H and platelets stable  Continue to monitor  · ppx vte: sqh  Endo:   · DM II:  Sliding scale insulin coverage  · Hypothyroidism:  Levothyroxine 100 mcg daily  Msk/Skin:   · Frequent offloading repositioning void skin breakdown  · PT/OT  Disposition:   · Stable for Med surge level of care versus discharge home     ______________________________________________________________________  Chief Complaint:  Patient offers no chief complaint she is anxious to go home  No acute events overnight      HPI/24hr events:   Aggressively diuresed  Weaned off of BiPAP    No longer require nitroglycerin  Started on metoprolol 25 mg b i d   No acute issues overnight     ______________________________________________________________________  Physical Exam:     Physical Exam   Constitutional: She is oriented to person, place, and time  HENT:   Head: Normocephalic and atraumatic  Eyes: Pupils are equal, round, and reactive to light  EOM are normal    Cardiovascular: Normal rate, regular rhythm and normal heart sounds  No murmur heard  Pulmonary/Chest: Effort normal and breath sounds normal  No respiratory distress  She has no wheezes  Fine crackles bilateral posterior lung fields  Musculoskeletal: She exhibits edema (Trace pitting edema)  Neurological: She is alert and oriented to person, place, and time  No cranial nerve deficit  Coordination normal    Skin: Skin is warm  Vitals reviewed  ______________________________________________________________________  Temperature:   Temp (24hrs), Av 9 °F (36 6 °C), Min:97 7 °F (36 5 °C), Max:98 2 °F (36 8 °C)    Current Temperature: 98 2 °F (36 8 °C)    Vitals:    18 0242 18 0254 18 0300 18 0330   BP: (!) 191/74 (!) 171/91 (!) 176/71 144/53   BP Location: Left arm      Pulse: 84  79 86   Resp: (!) 23  (!) 34 (!) 25   Temp: 98 2 °F (36 8 °C)      TempSrc: Oral      SpO2: 92%  93% 93%   Weight:       Height:                  Weights:   IBW: 57 kg    Body mass index is 40 61 kg/m²    Weight (last 2 days)     Date/Time   Weight    18 0134  111 (244 05)    18 2145  112 (246 03)            Height: 5' 5" (165 1 cm)    Intake and Outputs:  I/O        0701 -  0700  07 -  0700    Urine (mL/kg/hr) 2250 2400 (0 9)    Total Output 2250 2400    Net -2250 -2400          Unmeasured Urine Occurrence 2 x     Unmeasured Stool Occurrence  1 x        Nutrition:        Diet Orders            Start     Ordered    18 1722  Diet Byron/CHO Controlled; Consistent Carbohydrate Diet Level 2 (5 carb servings/75 grams CHO/meal); Sodium 2 GM  Diet effective now     Question Answer Comment   Diet Type Byron/CHO Controlled    Byron/CHO Controlled Consistent Carbohydrate Diet Level 2 (5 carb servings/75 grams CHO/meal)    Other Restriction(s): Sodium 2 GM    RD to adjust diet per protocol? No        11/23/18 1722        Labs:     Results from last 7 days  Lab Units 11/23/18  0439 11/23/18  0121 11/22/18  2147   WBC Thousand/uL 6 87  --  8 11   HEMOGLOBIN g/dL 10 4*  --  11 5   HEMATOCRIT % 32 6*  --  37 3   PLATELETS Thousands/uL 184 206 222   NEUTROS PCT % 69  --  70   MONOS PCT % 10  --  9      Results from last 7 days  Lab Units 11/23/18  1652 11/23/18  0439 11/22/18  2147   POTASSIUM mmol/L 3 8 3 6 3 5   CHLORIDE mmol/L 106 107 96*   CO2 mmol/L 35* 31 31   BUN mg/dL 17 21 21   CREATININE mg/dL 1 04 1 03 0 98   CALCIUM mg/dL 9 2 8 8 8 9   ALK PHOS U/L  --   --  89   ALT U/L  --   --  27   AST U/L  --   --  24       Results from last 7 days  Lab Units 11/23/18  1652 11/23/18  0439   MAGNESIUM mg/dL 2 3 2 2       Results from last 7 days  Lab Units 11/23/18  1652 11/23/18  0439   PHOSPHORUS mg/dL 3 4 3 6        Results from last 7 days  Lab Units 11/22/18  2147   INR  1 22*   PTT seconds 39*           0  Lab Value Date/Time   TROPONINI <0 02 11/23/2018 0439   TROPONINI 0 02 11/23/2018 0121   TROPONINI <0 02 11/23/2018 0121   TROPONINI <0 02 11/22/2018 2147     Imaging:  I have personally reviewed pertinent reports     and I have personally reviewed pertinent films in PACS  EKG:   Micro:  Blood Culture: No results found for: BLOODCX  Urine Culture: No results found for: URINECX  Sputum Culture: No components found for: SPUTUMCX  Wound Culure: No results found for: WOUNDCULT    No results found for: Alcon Singh, WOUNDCULT, SPUTUMCULTUR  Allergies: No Known Allergies  Medications:   Scheduled Meds:  Current Facility-Administered Medications:  chlorhexidine 15 mL Swish & Spit Q12H [address] NIKKI   donepezil 5 mg Oral HS Nik Rg PA-C   heparin (porcine) 5,000 Units Subcutaneous [facility] Nik Rg PA-C   hydrALAZINE 5 mg Intravenous Q6H PRN Ximena Solares PA-C   insulin lispro 1-5 Units Subcutaneous HS Ximena Solares PA-C   insulin lispro 1-6 Units Subcutaneous TID [facility] Ximena Solares PA-C   levothyroxine 100 mcg Oral Early Morning Nik Rg PA-C   memantine 10 mg Oral BID Nik Rg PA-C   metoprolol tartrate 25 mg Oral Q12H [address] Ximena Solares PA-C     Continuous Infusions:   PRN Meds:    hydrALAZINE 5 mg Q6H PRN     VTE Pharmacologic Prophylaxis: Heparin  VTE Mechanical Prophylaxis: sequential compression device  Invasive lines and devices: Invasive Devices     Peripheral Intravenous Line            Peripheral IV 11/22/18 Right Arm 1 day    Peripheral IV 11/23/18 Left Forearm 1 day                   Counseling / Coordination of Care  Total Critical Care time spent 30 minutes excluding procedures, teaching and family updates  Code Status: Level 1 - Full Code    Portions of the record may have been created with voice recognition software  Occasional wrong word or "sound a like" substitutions may have occurred due to the inherent limitations of voice recognition software  Read the chart carefully and recognize, using context, where substitutions have occurred      Nik Rg PA-C

## 2018-11-24 NOTE — DISCHARGE SUMMARY
Discharge Summary   Jhony Barrera 80 y o  female MRN: [id number]    [address] NON [place] Room / Bed: / Encounter: [id number]      Admitting Provider: Ardie Sicard, DO  Discharge Provider: Ardie Sicard, DO  Admission Date: 11/22/2018     Discharge Date: No discharge date for patient encounter  Primary Care Physician at Discharge: Matt Omer [phone]    Primary Discharge Diagnosis  Principal Problem:    Acute respiratory failure [facility])  Active Problems:    Volume overload    Hypertension  Resolved Problems:    * No resolved hospital problems  *      Other Problems Addressed  Patient Active Problem List    Diagnosis Date Noted    Acute respiratory failure ([address] ) 11/23/2018    Volume overload 11/23/2018    Hypertension 11/23/2018       Consulting Providers   None    Diagnostic Procedures Performed  XR chest 1 view portable   ED Interpretation   Volume overload c/w clinical presentation of acute heart failure      Final Result      Prominence of the cardiac silhouette, pulmonary vasculature, and interstitial markings  In addition there appears to be mild hypoaeration in the bilateral lower lung fields and blunting of the costophrenic sulci suggesting small pleural    effusion/atelectasis  Findings taken together are suspicious for fluid overload/CHF  Superimposed infection could be considered in the appropriate clinical setting  Correlation with the patient's symptoms and laboratory values recommended  Workstation performed: [id number]             Treatments   cardiac meds: furosemide  Procedures   None  Other Pertinent Test Results  None   Presenting Problem/History of Present Illness  Acute respiratory failure (HCC)   Jhony Barrera is a 80 y o  female who presented to [facility]22[facility] for evaluation of tachypnea and increased shortness of breath  Patient presents with her granddaughter who are both visiting the area over the Thanksgiving holiday  Patient lives with her son in the [facility]  From discussion with patient and granddaughter she has a past medical history of dementia, COPD, and insulin-dependent diabetes, other medical history is unknown at this time  Granddaughter states that over the past week patient has displayed progressive dyspnea with minimal exertion  After climbing a flight of stairs patient became acutely short of breath with obvious increased work of breathing  She is noted to have audible wheezing, and crackles prompting ED evaluation  Upon arrival to emergency department patient was found to be tachypneic and hypoxemic into the mid 70s  She was immediately placed on BiPAP  Chest x-ray revealed diffuse vascular congestion  Laboratory workup significant for elevated pro BNP of 2300  She received 80 mg of IV Lasix  Critical Care Medicine was asked to evaluate patient given acute on chronic hypoxic respiratory failure requiring BiPAP  She was subsequently admitted to step-down unit for further medical management and workup  Hospital Course  The patient was admitted to the step-down unit for evaluation of tachypnea and shortness of breath  She was initially started on BiPAP and nitroglycerin infusion  She was also diuresed  The nitroglycerin was able to be weaned off and she was able to be weaned off BiPAP as well  Her respiratory status improved significantly with diuresis  The patient was also continued on her beta-blocker and aspirin  She was transitioned to oral diuretic and tolerated this diuresis well  She was deemed stable for discharge on November 24th and will be discharged home with new prescriptions for Lasix and potassium  She will need to follow up with her PCP once she is back at home  Physical Exam  Vitals: Blood pressure 158/69, pulse 80, temperature 97 7 °F (36 5 °C), temperature source Oral, resp  rate 17, height 5' 5" (1 651 m), weight 100 kg (220 lb 7 4 oz), SpO2 92 %  ,Body mass index is 36 69 kg/m²  General Appearance:    Alert, cooperative, no distress   Head:    Normocephalic, without obvious abnormality, atraumatic   Eyes:    PERRL, conjunctiva/corneas clear, EOM's intact, fundi     benign, both eyes        Neck:   Supple, symmetrical, trachea midline, no adenopathy   Back:     Symmetric, no curvature, ROM normal, no CVA tenderness   Lungs:     Clear to auscultation bilaterally, respirations unlabored   Heart:    Regular rate and rhythm, S1 and S2 normal, no murmur, rub   or gallop   Abdomen:     Soft, non-tender, bowel sounds active    Extremities:   Extremities normal, atraumatic, no cyanosis or edema   Neurologic:   CNII-XII intact  Normal strength, sensation and reflexes       throughout       Discharge Disposition: Final discharge disposition not confirmed    Discharged With Lines: no    Test Results Pending at Discharge  None  Medications   See after visit summary for reconciled discharge medications provided to patient and family  Allergies  No Known Allergies  Diet restrictions: Cardiac  Activity restrictions: none  Discharge Condition: stable      Outpatient Follow-Up  PCP    Code Status: Level 1 - Full Code  Advance Directive and Living Will: <no information>  Power of :    POLST:      Discharge  Statement   I spent 20 minutes discharging the patient  This time was spent on the day of discharge  I had direct contact with the patient on the day of discharge  Additional documentation is required if more than 30 minutes were spent on discharge

## 2018-11-24 NOTE — PLAN OF CARE
Problem: DISCHARGE PLANNING - CARE MANAGEMENT  Goal: Discharge to post-acute care or home with appropriate resources  INTERVENTIONS:  - Conduct assessment to determine patient/family and health care team treatment goals, and need for post-acute services based on payer coverage, community resources, and patient preferences, and barriers to discharge  - Address psychosocial, clinical, and financial barriers to discharge as identified in assessment in conjunction with the patient/family and health care team  - Arrange appropriate level of post-acute services according to patients   needs and preference and payer coverage in collaboration with the physician and health care team  - Communicate with and update the patient/family, physician, and health care team regarding progress on the discharge plan  - Arrange appropriate transportation to post-acute venues   Outcome: Progressing  CM received call from ICU nurse that pt's granddaughter requesting more information about HHC  CM met with Aisha at bedside  Discussed that [facility] [address] would be able to provide HHC to granddaughters address  [facility] requesting info regarding HHA - discussed that this is typically an OOP unless pt qualifies for county assistance  Per [facility] they reside in [place] [facility] contact info added to [address] reports she will be transporting pt home at d/c and plans to follow up with pt's PCP Monday  Discussed with RN and MD Lobito Canela will continue to follow

## 2018-11-27 NOTE — UTILIZATION REVIEW
Notification of Discharge  This is a Notification of Discharge from our facility [address]  Please be advised that this patient has been discharge from our facility  Below you will find the admission and discharge date and time including the patients disposition  PRESENTATION DATE: 11/22/2018  9:39 PM  IP ADMISSION DATE: 11/22/18 2206  DISCHARGE DATE: 11/24/2018 12:56 PM  DISPOSITION: Home with [address] Utilization Review Department  Phone: [phone]; Fax [phone]  ATTENTION: Please call with any questions or concerns to [phone]  and carefully listen to the prompts so that you are directed to the right person  Send all requests for admission clinical reviews, approved or denied determinations and any other requests to fax [phone]   All voicemails are confidential